# Patient Record
Sex: MALE | Race: WHITE
[De-identification: names, ages, dates, MRNs, and addresses within clinical notes are randomized per-mention and may not be internally consistent; named-entity substitution may affect disease eponyms.]

---

## 2020-05-18 ENCOUNTER — HOSPITAL ENCOUNTER (OUTPATIENT)
Dept: HOSPITAL 56 - MW.ED | Age: 75
Setting detail: OBSERVATION
LOS: 1 days | Discharge: HOME | End: 2020-05-19
Attending: INTERNAL MEDICINE | Admitting: INTERNAL MEDICINE
Payer: MEDICARE

## 2020-05-18 DIAGNOSIS — H53.9: Primary | ICD-10-CM

## 2020-05-18 DIAGNOSIS — I10: ICD-10-CM

## 2020-05-18 DIAGNOSIS — Z79.82: ICD-10-CM

## 2020-05-18 DIAGNOSIS — Z87.74: ICD-10-CM

## 2020-05-18 DIAGNOSIS — Z79.899: ICD-10-CM

## 2020-05-18 DIAGNOSIS — I69.320: ICD-10-CM

## 2020-05-18 LAB
BUN SERPL-MCNC: 19 MG/DL (ref 7–18)
CHLORIDE SERPL-SCNC: 104 MMOL/L (ref 98–107)
CO2 SERPL-SCNC: 31.4 MMOL/L (ref 21–32)
GLUCOSE SERPL-MCNC: 97 MG/DL (ref 74–106)
POTASSIUM SERPL-SCNC: 4.2 MMOL/L (ref 3.5–5.1)
SODIUM SERPL-SCNC: 140 MMOL/L (ref 136–148)

## 2020-05-18 PROCEDURE — 85610 PROTHROMBIN TIME: CPT

## 2020-05-18 PROCEDURE — 80053 COMPREHEN METABOLIC PANEL: CPT

## 2020-05-18 PROCEDURE — 84484 ASSAY OF TROPONIN QUANT: CPT

## 2020-05-18 PROCEDURE — 80061 LIPID PANEL: CPT

## 2020-05-18 PROCEDURE — 70498 CT ANGIOGRAPHY NECK: CPT

## 2020-05-18 PROCEDURE — 85025 COMPLETE CBC W/AUTO DIFF WBC: CPT

## 2020-05-18 PROCEDURE — 70450 CT HEAD/BRAIN W/O DYE: CPT

## 2020-05-18 PROCEDURE — 81001 URINALYSIS AUTO W/SCOPE: CPT

## 2020-05-18 PROCEDURE — 84443 ASSAY THYROID STIM HORMONE: CPT

## 2020-05-18 PROCEDURE — 83036 HEMOGLOBIN GLYCOSYLATED A1C: CPT

## 2020-05-18 PROCEDURE — 93306 TTE W/DOPPLER COMPLETE: CPT

## 2020-05-18 PROCEDURE — 36415 COLL VENOUS BLD VENIPUNCTURE: CPT

## 2020-05-18 PROCEDURE — 93005 ELECTROCARDIOGRAM TRACING: CPT

## 2020-05-18 PROCEDURE — 99285 EMERGENCY DEPT VISIT HI MDM: CPT

## 2020-05-18 PROCEDURE — 85730 THROMBOPLASTIN TIME PARTIAL: CPT

## 2020-05-18 PROCEDURE — 70496 CT ANGIOGRAPHY HEAD: CPT

## 2020-05-18 NOTE — PCM.HP.2
H&P History of Present Illness





- General


Date of Service: 05/18/20


Admit Problem/Dx: 


 Admission Diagnosis/Problem





Admission Diagnosis/Problem      TIA, Transient ischemic attack











- History of Present Illness


Initial Comments - Free Text/Narative: 


This patient is a 75-year-old male with a past medical history of a remote CVA 

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 

currently not on any blood thinner, prostate Ca currently. Patient states that 

approximately 70 minutes prior to arrival to ER, he experienced total blindness 

to the bilateral upper visual fields of the right eye. His visual disturbance 

was not associated with any facial numbness or weakness, dysarthria, facial 

droop, extremity numbness or weakness, gait instability, nausea, or vomiting.   

This lasted for about 1 minute before symptoms subsided.  He took 2 full dose 

aspirin tablets  before driving to the emergency department. In the ER patients 

symptoms had resolved,  He denies any known history of hypertension, 

hyperlipidemia, or diabetes mellitus.  Patient was mildly hypertensive 

otherwise hemodynamically stable,  NIH score of 0. twelve-lead EKG, showing 

sinus bradycardia with a left bundle branch block and a first-degree AV block 

but no acute ischemia or ectopy.





Lab work was normal, Troponin testing is negative. Head CT along with 

angiography neck, showing a old left parietal infarct but no acute changes. 

Patient is being admitted for further care for possible TIA 





.





- Related Data


Allergies/Adverse Reactions: 


 Allergies











Allergy/AdvReac Type Severity Reaction Status Date / Time


 


No Known Allergies Allergy   Verified 05/18/20 19:32











Home Medications: 


 Home Meds





. [No Known Home Meds]  05/18/20 [History]











Past Medical History


HEENT History: Reports: None


Cardiovascular History: Reports: CAD, Hypertension


Respiratory History: Reports: None


Gastrointestinal History: Reports: None


Genitourinary History: Reports: None


Musculoskeletal History: Reports: None


Neurological History: Reports: CVA (Remote history of an ischemic CVA 

approximately 20 years ago), Other (See Below)


Other Neuro History: Stroke


Psychiatric History: Reports: None


Endocrine/Metabolic History: Reports: None


Hematologic History: Reports: None


Immunologic History: Reports: None


Oncologic (Cancer) History: Reports: None


Dermatologic History: Reports: None





- Infectious Disease History


Infectious Disease History: Reports: Chicken Pox





- Past Surgical History


HEENT Surgical History: Reports: Tonsillectomy


Male  Surgical History: Reports: None





Social & Family History





- Family History


Family Medical History: Noncontributory





- Tobacco Use


Smoking Status *Q: Never Smoker





- Caffeine Use


Caffeine Use: Reports: Coffee





- Recreational Drug Use


Recreational Drug Use: No





H&P Review of Systems





- Review of Systems:


Review Of Systems: See Below


General: Denies: Fever, Chills, Malaise


HEENT: Reports: Visual Changes (resolved).  Denies: Contact Lenses, Dysphasia


Pulmonary: Denies: Shortness of Breath, Wheezing, Pleuritic Chest Pain


Cardiovascular: Denies: Chest Pain, Palpitations, Dyspnea on Exertion


Gastrointestinal: Denies: Abdominal Pain, Anorexia, Black Stool, Hematemesis, 

Nausea


Genitourinary: Denies: Dysuria


Musculoskeletal: Denies: Neck Pain, Shoulder Pain


Skin: Denies: Cyanosis, Jaundice, Mottled


Psychiatric: Denies: Confusion, Depression, Mood Lability, Anxiety


Neurological: Denies: Confusion, Dizziness, Headache, Syncope, Tingling


Hematologic/Lymphatic: Denies: Anemia, Easy Bleeding, Easy Bruising





Exam





- Exam


Exam: See Below





- Vital Signs


Vital Signs: 


 Last Vital Signs











Temp  36.0 C L  05/18/20 19:29


 


Pulse  59 L  05/18/20 20:45


 


Resp  11 L  05/18/20 20:45


 


BP  150/84 H  05/18/20 20:45


 


Pulse Ox  99   05/18/20 20:45











Weight: 97.522 kg





- Exam


General: Alert, Oriented


HEENT: Conjunctiva Clear


Neck: Supple, Trachea Midline


Lungs: Clear to Auscultation, Normal Respiratory Effort


Cardiovascular: Regular Rhythm, Normal S1, Normal S2, Bradycardia


GI/Abdominal Exam: Normal Bowel Sounds, Soft, Non-Tender


Extremities: Normal Inspection, Normal Range of Motion


Peripheral Pulses: 3+: Dorsalis Pedis (L), Dorsalis Pedis (R)


Skin: Warm


Neuro Extensive - Mental Status: Alert, Oriented x3, Normal Mood/Affect


Neuro Extensive - Motor, Sensory, Reflexes: CN II-XII Intact, Normal Gait, 

Normal Reflexes.  No: Ataxia, Tongue Deviation (L), Dysarthria, Receptive 

Aphasia, Expressive Aphasia


DTR: 2+: Patella (L), Patella (R)





- Patient Data


Lab Results Last 24 hrs: 


 Laboratory Results - last 24 hr











  05/18/20 05/18/20 05/18/20 Range/Units





  19:05 19:05 19:05 


 


WBC  5.18    (4.0-11.0)  K/uL


 


RBC  4.44 L    (4.50-5.90)  M/uL


 


Hgb  13.6    (13.0-17.0)  g/dL


 


Hct  41.7    (38.0-50.0)  %


 


MCV  93.9    (80.0-98.0)  fL


 


MCH  30.6    (27.0-32.0)  pg


 


MCHC  32.6    (31.0-37.0)  g/dL


 


RDW Std Deviation  48.8    (28.0-62.0)  fl


 


RDW Coeff of Pamela  14    (11.0-15.0)  %


 


Plt Count  204    (150-400)  K/uL


 


MPV  9.70    (7.40-12.00)  fL


 


Neut % (Auto)  54.6    (48.0-80.0)  %


 


Lymph % (Auto)  32.2    (16.0-40.0)  %


 


Mono % (Auto)  8.5    (0.0-15.0)  %


 


Eos % (Auto)  3.5    (0.0-7.0)  %


 


Baso % (Auto)  1.2    (0.0-1.5)  %


 


Neut # (Auto)  2.8    (1.4-5.7)  K/uL


 


Lymph # (Auto)  1.7    (0.6-2.4)  K/uL


 


Mono # (Auto)  0.4    (0.0-0.8)  K/uL


 


Eos # (Auto)  0.2    (0.0-0.7)  K/uL


 


Baso # (Auto)  0.1    (0.0-0.1)  K/uL


 


Nucleated RBC %  0.0    /100WBC


 


Nucleated RBCs #  0    K/uL


 


INR   1.01   


 


APTT   27.8   (18.6-31.3)  SEC


 


Sodium    140  (136-148)  mmol/L


 


Potassium    4.2  (3.5-5.1)  mmol/L


 


Chloride    104  ()  mmol/L


 


Carbon Dioxide    31.4  (21.0-32.0)  mmol/L


 


BUN    19 H  (7.0-18.0)  mg/dL


 


Creatinine    1.1  (0.8-1.3)  mg/dL


 


Est Cr Clr Drug Dosing    75.38  mL/min


 


Estimated GFR (MDRD)    > 60.0  ml/min


 


Glucose    97  ()  mg/dL


 


Calcium    8.6  (8.5-10.1)  mg/dL


 


Total Bilirubin    0.4  (0.2-1.0)  mg/dL


 


AST    18  (15-37)  IU/L


 


ALT    24  (14-63)  IU/L


 


Alkaline Phosphatase    66  ()  U/L


 


Troponin I    < 0.050  (0.000-0.056)  ng/mL


 


Total Protein    6.6  (6.4-8.2)  g/dL


 


Albumin    3.6  (3.4-5.0)  g/dL


 


Globulin    3.0  (2.6-4.0)  g/dL


 


Albumin/Globulin Ratio    1.2  (0.9-1.6)  











Result Diagrams: 


 05/18/20 19:05





 05/18/20 19:05





Sepsis Event Note





- Evaluation


Sepsis Screening Result: No Definite Risk





- Focused Exam


Vital Signs: 


 Vital Signs











  Temp Pulse Resp BP Pulse Ox


 


 05/18/20 20:45   59 L  11 L  150/84 H  99


 


 05/18/20 20:00   57 L  11 L  165/90 H  100


 


 05/18/20 19:45   57 L  12  156/79 H  98


 


 05/18/20 19:30   56 L  16  156/79 H  98


 


 05/18/20 19:29  36.0 C L  62  20  162/86 H  95











Date Exam was Performed: 05/18/20


Time Exam was Performed: 22:44





- Problem List


(1) Vision changes


SNOMED Code(s): 087129997


   ICD Code: H53.9 - UNSPECIFIED VISUAL DISTURBANCE   Status: Acute   Current 

Visit: No   





(2) CVA, old, aphasia


SNOMED Code(s): 543151082


   ICD Code: I69.320 - APHASIA FOLLOWING CEREBRAL INFARCTION   Status: Acute   

Current Visit: No   





(3) HTN (hypertension)


SNOMED Code(s): 59464745


   ICD Code: I10 - ESSENTIAL (PRIMARY) HYPERTENSION   Status: Acute   Current 

Visit: No   


Problem List Initiated/Reviewed/Updated: Yes


Orders Last 24hrs: 


 Active Orders 24 hr











 Category Date Time Status


 


 Admission Status [Patient Status] [ADT] Stat ADT  05/18/20 21:18 Active


 


 Ambulate [RC] ASDIRECTED Care  05/18/20 21:29 Ordered


 


 Antiembolic Devices [RC] PER UNIT ROUTINE Care  05/18/20 21:30 Ordered


 


 Assess Neurological Status [RC] CONTINUOUS Care  05/18/20 19:10 Active


 


 Blood Glucose Check, Bedside [RC] STAT Care  05/18/20 19:10 Active


 


 Cardiac Monitoring [RC] CONTINUOUS Care  05/18/20 19:10 Active


 


 Communication Order [RC] STAT Care  05/18/20 19:10 Active


 


 EKG 12 Lead [EKG Documentation Completion] [RC] STAT Care  05/18/20 19:14 

Active


 


 Height and Weight [RC] UPON Care  05/18/20 19:10 Active


 


 NIH Stroke Scale [RC] STAT Care  05/18/20 19:10 Active


 


 Oxygen Therapy [RC] PRN Care  05/18/20 21:29 Ordered


 


 Oxygen Therapy, ED [RC] ASDIRECTED Care  05/18/20 19:10 Active


 


 VTE/DVT Education [RC] PER UNIT ROUTINE Care  05/18/20 21:29 Ordered


 


 Vital Signs [RC] Q15M Care  05/18/20 19:10 Active


 


 Vital Signs [RC] Q4H Care  05/18/20 21:29 Ordered


 


 Heart Healthy Diet [DIET] Diet  05/18/20 Dinner Ordered


 


 Ang Head wo Cont [MR] Routine Exams  05/18/20 21:32 Ordered


 


 Brain wo Cont [MR] Routine Exams  05/18/20 21:32 Ordered


 


 GLYCOSYLATED HEMOGLOBIN,HGBA1C [CHEM] AM Lab  05/19/20 05:11 Ordered


 


 LIPID PANEL [CHEM] AM Lab  05/19/20 05:11 Ordered


 


 TROPONIN I [CHEM] Routine Lab  05/18/20 22:00 Ordered


 


 TSH [CHEM] AM Lab  05/19/20 05:11 Ordered


 


 UA W/MICROSCOPIC [URIN] Routine Lab  05/18/20 21:29 Ordered


 


 Acetaminophen [Tylenol] Med  05/18/20 21:29 Ordered





 650 mg PO Q4H PRN   


 


 Aspirin Med  05/19/20 09:00 Ordered





 81 mg PO DAILY   


 


 Sodium Chloride 0.9% [Normal Saline] Med  05/18/20 19:10 Active





 10 ml IV ASDIRECTED PRN   


 


 Sodium Chloride 0.9% [Saline Flush] Med  05/18/20 19:10 Active





 10 ml FLUSH ASDIRECTED PRN   


 


 Sodium Chloride 0.9% [Saline Flush] Med  05/18/20 19:10 Active





 2.5 ml FLUSH ASDIRECTED PRN   


 


 atorvaSTATin [Lipitor] Med  05/19/20 21:00 Ordered





 40 mg PO BEDTIME   


 


 Peripheral IV Insertion Adult [OM.PC] Stat Oth  05/18/20 19:10 Ordered


 


 Sequential Compression Device [OM.PC] Per Unit Routine Oth  05/18/20 21:29 

Ordered


 


 Resuscitation Status Stat Resus Stat  05/18/20 19:10 Ordered








 Medication Orders





Acetaminophen (Tylenol)  650 mg PO Q4H PRN


   PRN Reason: Pain (Mild 1-3)/fever


Aspirin (Aspirin)  81 mg PO DAILY ASIA


Atorvastatin Calcium (Lipitor)  40 mg PO BEDTIME ASIA


Sodium Chloride (Saline Flush)  10 ml FLUSH ASDIRECTED PRN


   PRN Reason: Keep Vein Open


Sodium Chloride (Saline Flush)  2.5 ml FLUSH ASDIRECTED PRN


   PRN Reason: Keep Vein Open


Sodium Chloride (Normal Saline)  10 ml IV ASDIRECTED PRN


   PRN Reason: IV Use








Assessment/Plan Comment:: 





76 y/o M admitted for possible TIA


CTA scan negative for acute findings


Obtain MRI/MRA of brain


Obtain 2D ECHO


Lipid panel, Glycated Hb, TSH for risk stratification


ASA, Statin


Allow permissive HTN 


SCD for DVT ppx

## 2020-05-18 NOTE — EDM.PDOC
ED HPI GENERAL MEDICAL PROBLEM





- General


Chief Complaint: Neuro Symptoms/Deficits


Stated Complaint: STROKE CODE


Time Seen by Provider: 05/18/20 19:10


Source of Information: Reports: Patient


History Limitations: Reports: No Limitations





- History of Present Illness


INITIAL COMMENTS - FREE TEXT/NARRATIVE: 





This patient is a 75-year-old male with a past medical history of a ischemic 

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 

strokelike symptoms.  Approximately 70 minutes prior to arrival, the patient 

noted that he had total blindness to the bilateral upper visual fields of the 

right eye.  This lasted for about 1 minute before symptoms subsided.  He took 2 

full dose aspirin tablets (approximately 648 mg) before driving to the 

emergency department.





Here in the emergency department, he denies any neurologic complaints at this 

point.  His visual disturbance was not associated with any facial numbness or 

weakness, dysarthria, facial droop, extremity numbness or weakness, gait 

instability, nausea, or vomiting.  He denies any known history of hypertension, 

hyperlipidemia, or diabetes mellitus.  At present, he has no complaints.


Onset: Today


Duration: Hour(s): (1)





- Related Data


 Allergies











Allergy/AdvReac Type Severity Reaction Status Date / Time


 


No Known Allergies Allergy   Verified 05/18/20 19:32











Home Meds: 


 Home Meds





. [No Known Home Meds]  05/18/20 [History]











Past Medical History


HEENT History: Reports: None


Cardiovascular History: Reports: CAD, Hypertension


Respiratory History: Reports: None


Gastrointestinal History: Reports: None


Genitourinary History: Reports: None


Musculoskeletal History: Reports: None


Neurological History: Reports: CVA (Remote history of an ischemic CVA 

approximately 20 years ago), Other (See Below)


Other Neuro History: Stroke


Psychiatric History: Reports: None


Endocrine/Metabolic History: Reports: None


Hematologic History: Reports: None


Immunologic History: Reports: None


Oncologic (Cancer) History: Reports: None


Dermatologic History: Reports: None





- Infectious Disease History


Infectious Disease History: Reports: Chicken Pox





- Past Surgical History


HEENT Surgical History: Reports: Tonsillectomy


Male  Surgical History: Reports: None





Social & Family History





- Family History


Family Medical History: Noncontributory





- Caffeine Use


Caffeine Use: Reports: Coffee





ED ROS GENERAL





- Review of Systems


Review Of Systems: Comprehensive ROS is negative, except as noted in HPI.


Constitutional: Denies: Fever


HEENT: Reports: Vision Change


Respiratory: Denies: Shortness of Breath


Cardiovascular: Denies: Chest Pain


Endocrine: Reports: No Symptoms


GI/Abdominal: Denies: Abdominal Pain, Difficulty Swallowing, Nausea, Vomiting


: Reports: No Symptoms


Musculoskeletal: Reports: No Symptoms


Skin: Reports: No Symptoms


Neurological: Reports: Other (Transient visual disturbance).  Denies: Dizziness

, Headache, Numbness, Trouble Speaking, Difficulty Walking, Weakness, Change in 

Speech, Gait Disturbance


Psychiatric: Reports: No Symptoms


Hematologic/Lymphatic: Reports: No Symptoms


Immunologic: Reports: No Symptoms





ED EXAM, NEURO





- Physical Exam


Exam: See Below


Exam Limited By: No Limitations


General Appearance: Alert


Ears: Hearing Grossly Normal


Nose: Normal Inspection


Throat/Mouth: Normal Inspection


Head Exam: Atraumatic, Normocephalic


Neck: Supple, Non-Tender, Full Range of Motion.  No: Carotid Bruit, Limited 

Range of Motion


Respiratory/Chest: Lungs Clear, Normal Breath Sounds, No Accessory Muscle Use


Cardiovascular: Normal Peripheral Pulses, Regular Rate, Rhythm, No Edema, No 

Gallop


GI/Abdominal: Soft, Non-Tender


Neurological: Alert, Normal Mood/Affect, CN II-XII Intact, No Motor/Sensory 

Deficits, Oriented x 3, Other.  No: Abnormal Gait, Abnormal Finger to Nose


Extremities: Normal Range of Motion


Psychiatric: Normal Affect, Normal Mood


Skin Exam: Warm, Dry





EKG INTERPRETATION


EKG Date: 05/18/20


Time: 19:31


Rhythm: Other (Sinus bradycardia)


Rate (Beats/Min): 55


Axis: LAD-Left Axis Deviation


P-Wave: Enlarged


QRS: LBBB


ST-T: Normal


QT: Normal


EKG Interpretation Comments: 





Sinus bradycardia 55 bpm.  Left axis deviation.  First-degree AV block.  Left 

bundle branch block.  No ectopy or acute ischemia appreciated.





Course





- Vital Signs


Text/Narrative:: 





On arrival, the patient was mildly hypertensive but afebrile and 

hemodynamically stable, well-appearing.  Patient was immediately roomed in 

triage.  No focal neurologic deficits noted, NIH score of 0.  IV access was 

established and labs were sent off.  We obtained a twelve-lead EKG, showing 

sinus bradycardia with a left bundle branch block and a first-degree AV block 

but no acute ischemia or ectopy.





Labs returned showing no significant derangements.  Normal cell lines.  Normal 

INR and APTT.  Metabolic panel shows mildly elevated BUN but normal creatinine 

and electrolytes.  Troponin testing is negative.  Hepatic markers are negative.

  We obtained a noncontrast head CT along with angiography neck, showing a old 

left parietal infarct but no acute changes.





Patient received full dose aspirin prior to arrival to the hospital so this was 

not given here.  Symptoms are concerning for a transient ischemic attack.  

ABCD2 score is 2.  Serial examinations revealed no neurologic deficits, NIH 

score remains 0 during the emergency department course of treatment.  Will plan 

for admission to the hospital on observation status for TIA work-up.  I 

discussed with the accepting hospitalist Dr. Guillermo who agrees to admit to 

observation.


Last Recorded V/S: 


 Last Vital Signs











Temp  36.0 C L  05/18/20 19:29


 


Pulse  59 L  05/18/20 20:45


 


Resp  11 L  05/18/20 20:45


 


BP  150/84 H  05/18/20 20:45


 


Pulse Ox  99   05/18/20 20:45














- Orders/Labs/Meds


Orders: 


 Active Orders 24 hr











 Category Date Time Status


 


 Admission Status [Patient Status] [ADT] Stat ADT  05/18/20 21:18 Active


 


 Ambulate [RC] ASDIRECTED Care  05/18/20 21:29 Active


 


 Antiembolic Devices [RC] PER UNIT ROUTINE Care  05/18/20 21:30 Active


 


 Assess Neurological Status [RC] CONTINUOUS Care  05/18/20 19:10 Active


 


 Blood Glucose Check, Bedside [RC] STAT Care  05/18/20 19:10 Active


 


 Cardiac Monitoring [RC] CONTINUOUS Care  05/18/20 19:10 Active


 


 Communication Order [RC] STAT Care  05/18/20 19:10 Active


 


 EKG 12 Lead [EKG Documentation Completion] [RC] STAT Care  05/18/20 19:14 

Active


 


 Height and Weight [RC] UPON Care  05/18/20 19:10 Active


 


 NIH Stroke Scale [RC] STAT Care  05/18/20 19:10 Active


 


 Oxygen Therapy [RC] PRN Care  05/18/20 21:29 Active


 


 Oxygen Therapy, ED [RC] ASDIRECTED Care  05/18/20 19:10 Active


 


 VTE/DVT Education [RC] PER UNIT ROUTINE Care  05/18/20 21:29 Active


 


 Vital Signs [RC] Q15M Care  05/18/20 19:10 Active


 


 Vital Signs [RC] Q4H Care  05/18/20 21:29 Active


 


 Heart Healthy Diet [DIET] Diet  05/18/20 Dinner Active


 


 Ang Head wo Cont [MR] Routine Exams  05/18/20 21:32 Ordered


 


 Brain wo Cont [MR] Routine Exams  05/18/20 21:32 Ordered


 


 Echo Comp wo Cont [US] Stat Exams  05/18/20 21:53 Ordered


 


 GLYCOSYLATED HEMOGLOBIN,HGBA1C [CHEM] AM Lab  05/19/20 05:11 Ordered


 


 LIPID PANEL [CHEM] AM Lab  05/19/20 05:11 Ordered


 


 TROPONIN I [CHEM] Routine Lab  05/18/20 22:00 Ordered


 


 TSH [CHEM] AM Lab  05/19/20 05:11 Ordered


 


 UA W/MICROSCOPIC [URIN] Routine Lab  05/18/20 21:29 Ordered


 


 Acetaminophen [Tylenol] Med  05/18/20 21:29 Active





 650 mg PO Q4H PRN   


 


 Aspirin Med  05/19/20 09:00 Active





 81 mg PO DAILY   


 


 Sodium Chloride 0.9% [Normal Saline] Med  05/18/20 19:10 Active





 10 ml IV ASDIRECTED PRN   


 


 Sodium Chloride 0.9% [Saline Flush] Med  05/18/20 19:10 Active





 10 ml FLUSH ASDIRECTED PRN   


 


 Sodium Chloride 0.9% [Saline Flush] Med  05/18/20 19:10 Active





 2.5 ml FLUSH ASDIRECTED PRN   


 


 atorvaSTATin [Lipitor] Med  05/19/20 21:00 Active





 40 mg PO BEDTIME   


 


 Peripheral IV Insertion Adult [OM.PC] Stat Oth  05/18/20 19:10 Ordered


 


 Sequential Compression Device [OM.PC] Per Unit Routine Oth  05/18/20 21:29 

Ordered


 


 Resuscitation Status Stat Resus Stat  05/18/20 19:10 Ordered








 Medication Orders





Acetaminophen (Tylenol)  650 mg PO Q4H PRN


   PRN Reason: Pain (Mild 1-3)/fever


Aspirin (Aspirin)  81 mg PO DAILY ASIA


Atorvastatin Calcium (Lipitor)  40 mg PO BEDTIME ASIA


Sodium Chloride (Saline Flush)  10 ml FLUSH ASDIRECTED PRN


   PRN Reason: Keep Vein Open


Sodium Chloride (Saline Flush)  2.5 ml FLUSH ASDIRECTED PRN


   PRN Reason: Keep Vein Open


Sodium Chloride (Normal Saline)  10 ml IV ASDIRECTED PRN


   PRN Reason: IV Use








Labs: 


 Laboratory Tests











  05/18/20 05/18/20 05/18/20 Range/Units





  19:05 19:05 19:05 


 


WBC  5.18    (4.0-11.0)  K/uL


 


RBC  4.44 L    (4.50-5.90)  M/uL


 


Hgb  13.6    (13.0-17.0)  g/dL


 


Hct  41.7    (38.0-50.0)  %


 


MCV  93.9    (80.0-98.0)  fL


 


MCH  30.6    (27.0-32.0)  pg


 


MCHC  32.6    (31.0-37.0)  g/dL


 


RDW Std Deviation  48.8    (28.0-62.0)  fl


 


RDW Coeff of Pamela  14    (11.0-15.0)  %


 


Plt Count  204    (150-400)  K/uL


 


MPV  9.70    (7.40-12.00)  fL


 


Neut % (Auto)  54.6    (48.0-80.0)  %


 


Lymph % (Auto)  32.2    (16.0-40.0)  %


 


Mono % (Auto)  8.5    (0.0-15.0)  %


 


Eos % (Auto)  3.5    (0.0-7.0)  %


 


Baso % (Auto)  1.2    (0.0-1.5)  %


 


Neut # (Auto)  2.8    (1.4-5.7)  K/uL


 


Lymph # (Auto)  1.7    (0.6-2.4)  K/uL


 


Mono # (Auto)  0.4    (0.0-0.8)  K/uL


 


Eos # (Auto)  0.2    (0.0-0.7)  K/uL


 


Baso # (Auto)  0.1    (0.0-0.1)  K/uL


 


Nucleated RBC %  0.0    /100WBC


 


Nucleated RBCs #  0    K/uL


 


INR   1.01   


 


APTT   27.8   (18.6-31.3)  SEC


 


Sodium    140  (136-148)  mmol/L


 


Potassium    4.2  (3.5-5.1)  mmol/L


 


Chloride    104  ()  mmol/L


 


Carbon Dioxide    31.4  (21.0-32.0)  mmol/L


 


BUN    19 H  (7.0-18.0)  mg/dL


 


Creatinine    1.1  (0.8-1.3)  mg/dL


 


Est Cr Clr Drug Dosing    75.38  mL/min


 


Estimated GFR (MDRD)    > 60.0  ml/min


 


Glucose    97  ()  mg/dL


 


Calcium    8.6  (8.5-10.1)  mg/dL


 


Total Bilirubin    0.4  (0.2-1.0)  mg/dL


 


AST    18  (15-37)  IU/L


 


ALT    24  (14-63)  IU/L


 


Alkaline Phosphatase    66  ()  U/L


 


Troponin I    < 0.050  (0.000-0.056)  ng/mL


 


Total Protein    6.6  (6.4-8.2)  g/dL


 


Albumin    3.6  (3.4-5.0)  g/dL


 


Globulin    3.0  (2.6-4.0)  g/dL


 


Albumin/Globulin Ratio    1.2  (0.9-1.6)  











Meds: 


Medications











Generic Name Dose Route Start Last Admin





  Trade Name Freq  PRN Reason Stop Dose Admin


 


Acetaminophen  650 mg  05/18/20 21:29  





  Tylenol  PO   





  Q4H PRN   





  Pain (Mild 1-3)/fever   





     





     





     


 


Aspirin  81 mg  05/19/20 09:00  





  Aspirin  PO   





  DAILY ASIA   





     





     





     





     


 


Atorvastatin Calcium  40 mg  05/19/20 21:00  





  Lipitor  PO   





  BEDTIME ASIA   





     





     





     





     


 


Sodium Chloride  10 ml  05/18/20 19:10  





  Saline Flush  FLUSH   





  ASDIRECTED PRN   





  Keep Vein Open   





     





     





     


 


Sodium Chloride  2.5 ml  05/18/20 19:10  





  Saline Flush  FLUSH   





  ASDIRECTED PRN   





  Keep Vein Open   





     





     





     


 


Sodium Chloride  10 ml  05/18/20 19:10  





  Normal Saline  IV   





  ASDIRECTED PRN   





  IV Use   





     





     





     














Discontinued Medications














Generic Name Dose Route Start Last Admin





  Trade Name Freq  PRN Reason Stop Dose Admin


 


Aspirin  324 mg  05/18/20 19:10  05/18/20 19:29





  Aspirin  PO  05/18/20 19:11  Not Given





  ONETIME ONE   





     





     





     





     


 


Iopamidol  100 ml  05/18/20 20:47  05/18/20 20:47





  Isovue-370 (76%)  IVPUSH  05/18/20 20:48  100 ml





  ONETIME STA   Administration





     





     





     





     














- Re-Assessments/Exams


Free Text/Narrative Re-Assessment/Exam: 





05/18/20 21:34


Resting comfortably, no complaints.  Ambulated without difficulty.  No 

neurologic deficits noted.  Negative Romberg.  Remains complaint free.





Departure





- Departure


Time of Disposition: 21:00


Disposition: Refer to Observation


Condition: Good


Clinical Impression: 


 TIA (transient ischemic attack)








- Discharge Information


*PRESCRIPTION DRUG MONITORING PROGRAM REVIEWED*: Not Applicable


*COPY OF PRESCRIPTION DRUG MONITORING REPORT IN PATIENT JOSEPH: Not Applicable


Forms:  ED Department Discharge





Sepsis Event Note





- Focused Exam


Vital Signs: 


 Vital Signs











  Temp Pulse Resp BP Pulse Ox


 


 05/18/20 20:45   59 L  11 L  150/84 H  99


 


 05/18/20 20:00   57 L  11 L  165/90 H  100


 


 05/18/20 19:45   57 L  12  156/79 H  98


 


 05/18/20 19:30   56 L  16  156/79 H  98


 


 05/18/20 19:29  36.0 C L  62  20  162/86 H  95











Date Exam was Performed: 05/18/20


Time Exam was Performed: 21:54





- My Orders


Last 24 Hours: 


My Active Orders





05/18/20 19:10


Assess Neurological Status [RC] CONTINUOUS 


Blood Glucose Check, Bedside [RC] STAT 


Cardiac Monitoring [RC] CONTINUOUS 


Communication Order [RC] STAT 


Height and Weight [RC] UPON 


NIH Stroke Scale [RC] STAT 


Oxygen Therapy, ED [RC] ASDIRECTED 


Vital Signs [RC] Q15M 


Sodium Chloride 0.9% [Normal Saline]   10 ml IV ASDIRECTED PRN 


Sodium Chloride 0.9% [Saline Flush]   10 ml FLUSH ASDIRECTED PRN 


Sodium Chloride 0.9% [Saline Flush]   2.5 ml FLUSH ASDIRECTED PRN 


Peripheral IV Insertion Adult [OM.PC] Stat 


Resuscitation Status Stat 





05/18/20 19:14


EKG 12 Lead [EKG Documentation Completion] [RC] STAT 





05/18/20 21:18


Admission Status [Patient Status] [ADT] Stat 














- Assessment/Plan


Last 24 Hours: 


My Active Orders





05/18/20 19:10


Assess Neurological Status [RC] CONTINUOUS 


Blood Glucose Check, Bedside [RC] STAT 


Cardiac Monitoring [RC] CONTINUOUS 


Communication Order [RC] STAT 


Height and Weight [RC] UPON 


NIH Stroke Scale [RC] STAT 


Oxygen Therapy, ED [RC] ASDIRECTED 


Vital Signs [RC] Q15M 


Sodium Chloride 0.9% [Normal Saline]   10 ml IV ASDIRECTED PRN 


Sodium Chloride 0.9% [Saline Flush]   10 ml FLUSH ASDIRECTED PRN 


Sodium Chloride 0.9% [Saline Flush]   2.5 ml FLUSH ASDIRECTED PRN 


Peripheral IV Insertion Adult [OM.PC] Stat 


Resuscitation Status Stat 





05/18/20 19:14


EKG 12 Lead [EKG Documentation Completion] [RC] STAT 





05/18/20 21:18


Admission Status [Patient Status] [ADT] Stat

## 2020-05-18 NOTE — CT
Head CT

 

Technique: Multiple axial sections through the brain were obtained.  

Intravenous contrast was not utilized.

 

Comparison: Prior head CT study of 09/23/17.

 

Findings: Old infarct is noted within the left parietal region.  

Findings are similar to previous exam.

 

Ventricles along with basal cisterns and sulci over the convexities 

are within normal limits for the patient's age.  No other abnormal 

parenchymal densities are seen.  No evidence of intracranial 

hemorrhage.  No midline shift or mass-effect is seen.

 

No acute calvarial finding is seen.  Minimal mucosal thickening is 

seen within the inferior right mastoid sinuses which is likely 

incidental.  Mucosal thickening is seen within the paranasal sinuses 

which is most likely chronic.

 

Impression:

1.  Probable chronic sinusitis.

2.  Old infarct within the left parietal region.

3.  No acute intracranial abnormality is appreciated.

 

Diagnostic code #2

 

This report was dictated in MDT

## 2020-05-18 NOTE — CT
CT angiogram of neck (without and with intravenous contrast)

 

Noncontrast images shows mild atherosclerotic calcification within the

 carotid bulb.  Mucosal thickening seen within the maxillary sinuses, 

ethmoid sinuses.  These paranasal sinus findings are most likely 

chronic.  Neck shows no adenopathy.  Technique: Multiple axial 

sections were obtained through the neck.  Intravenous contrast was 

utilized.  Study performed as a CT angiogram protocol.  Multiple MIP 

images were obtained.

 

Findings: Vertebral arteries are opacified no evidence of vertebral 

stenosis.  No dissection is seen.  Patency into the basilar artery is 

noted.

 

Common carotid arteries are patent.  No focal stenosis is seen.  Mild 

atherosclerotic calcification is noted within the carotid bulb.  No 

focal stenosis is seen within the carotid bulb.  Internal carotid 

arteries are patent.  Internal carotid are patent into the carotid 

siphon.

 

Impression:

1.  Mild atherosclerotic calcification around the carotid bulb.

2.  No focal stenosis, occlusion or dissection is seen within the 

common carotid arteries, vertebral arteries or internal carotid 

arteries.

3.  Sinus findings which are most likely chronic.

 

Diagnostic code #2

 

This report was dictated in MDT

## 2020-05-18 NOTE — CT
CT angiogram of brain

 

Technique: Multiple axial sections through the brain were obtained.  

Intravenous contrast was performed.  Intravenous contrast obtained 

during the arterial phase.  Multiple MIP images were obtained.

 

Findings: Basilar artery is patent into the posterior cerebral 

arteries.  No focal stenosis is seen.  Common carotid arteries are 

patent into the middle cerebral arteries and anterior cerebral 

arteries.  No focal stenosis is seen.  No vascular occlusion is 

identified.  No discrete aneurysm is appreciated.

 

Impression:

1.  No abnormality is identified on CT angiogram study of the brain.

 

Diagnostic code #1

 

This report was dictated in MDT

## 2020-05-19 VITALS — DIASTOLIC BLOOD PRESSURE: 81 MMHG | SYSTOLIC BLOOD PRESSURE: 158 MMHG | HEART RATE: 52 BPM

## 2020-05-19 LAB — HBA1C BLD-MCNC: 5.4 % (ref 4.5–6.2)

## 2020-05-19 NOTE — [id number]
<Shayy Contreras - Last Filed: 05/19/20 11:37>





**Discharge Summary





- Hospital Course


HPI Initial Comments: 


Admission Date: 5/18/20


Discharge Date:  5/19/20





Admission Diagnosis:


1.   Transient vision loss suspect TIA


2.    History of CVA and repaired PFO





Discharge Diagnosis:


1.   Transient vision loss suspect TIA


2.    History of CVA and repaired PFO





Procedures: None





Consults: None





Hospital Course: This patient is a 75-year-old male with a past medical history 

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 

presented to the ER with 1 min history of vision loss. He states he lost the 

upper visual fields of the right eye.  Denied any additional symptoms.  Work up 

in the ER revealed no significant values, troponin was negative.  Head CT, Head/

neck CTA showed his old left parietal infarct but no acute changes.  Patient 

was admitted to the medical floor.  He was started on aspirin and statin.  He 

was monitored on telemetry.  Echo and MRI of the brain were scheduled but 

patient requested discharge and requested tests to be done as an outpatient.  

He had no further episodes of vision loss or additional symptoms consistent 

with TIA/stroke.  





Disposition: Home





Discharge Condition:  vitals stable, tolerating oral diet, ambulating without 

difficulty, symptom improvement





Discharge Instructions: regular diet as tolerated, activity as tolerated, take 

medications as prescribed.  Symptoms to report to physician include fever/chills

, vision loss, facial droop, slurred speech, extremity weakness,chest pain, 

shortness of breath, abdominal pain, erythema, drainage/discharge, or not 

improving as expected.





Discharge Medications:





Ascorbate Calcium [Vitamin C] 500 mg PO DAILY


Lutein 1 cap PO DAILY 


Omega-3/DHA/Epa/Fish Oil [Fish Oil 1,000 mg Softgel] 1 cap PO 


Aspirin 325 mg PO DAILY


atorvaSTATin [Lipitor] 40 mg PO BEDTIME  tablet 





Follow-up:


1. PCP


2. Cardiology


3. Outpatient imaging- echo and MRI brain





- Discharge Data


Discharge Date: 05/19/20


Discharge Disposition: Home, Self-Care 01


Condition: Stable





- Referral to Home Health


Primary Care Physician: 


PCP None








- Patient Instructions


Diet: Usual Diet as Tolerated


Activity: As Tolerated


Showering/Bathing: May Shower


Notify Provider of: Fever, Increased Pain, Swelling and Redness, Drainage, 

Nausea and/or Vomiting


Other/Special Instructions: Additional symptoms include chest pain, shortness 

of breath, abdominal pain, slurred speech, vision changes, facial droop, or 

extremity weakness. You will need outpatient MRI of the brain and echo of the 

heart.





- Discharge Plan


*PRESCRIPTION DRUG MONITORING PROGRAM REVIEWED*: Not Applicable


*COPY OF PRESCRIPTION DRUG MONITORING REPORT IN PATIENT JOSEPH: Not Applicable


Prescriptions/Med Rec: 


Aspirin 325 mg PO DAILY 30 Days #30 tablet


Home Medications: 


 Home Meds





Ascorbate Calcium [Vitamin C] 500 mg PO DAILY 05/18/20 [History]


Lutein 1 cap PO DAILY 05/18/20 [History]


Omega-3/DHA/Epa/Fish Oil [Fish Oil 1,000 mg Softgel] 1 cap PO 05/18/20 [History]


Aspirin 325 mg PO DAILY 30 Days #30 tablet 05/19/20 [Rx]


atorvaSTATin [Lipitor] 40 mg PO BEDTIME  tablet 05/19/20 [Rx]








Patient Handouts:  Transient Ischemic Attack, Easy-to-Read, Magnetic Resonance 

Imaging, Easy-to-Read, Atorvastatin tablets, Aspirin, ASA oral tablets, 

Echocardiogram





- Discharge Summary/Plan Comment


DC Time >30 min.: No





- Patient Data


Vitals - Most Recent: 


 Last Vital Signs











Temp  98.1 F   05/19/20 08:00


 


Pulse  52 L  05/19/20 08:00


 


Resp  16   05/19/20 08:00


 


BP  158/81 H  05/19/20 08:00


 


Pulse Ox  94 L  05/19/20 08:00











Weight - Most Recent: 97.522 kg


I&O - Last 24 hours: 


 Intake & Output











 05/18/20 05/19/20 05/19/20





 22:59 06:59 14:59


 


Intake Total  550 


 


Output Total  400 


 


Balance  150 











Lab Results - Last 24 hrs: 


 Laboratory Results - last 24 hr











  05/18/20 05/18/20 05/18/20 Range/Units





  19:05 19:05 19:05 


 


WBC  5.18    (4.0-11.0)  K/uL


 


RBC  4.44 L    (4.50-5.90)  M/uL


 


Hgb  13.6    (13.0-17.0)  g/dL


 


Hct  41.7    (38.0-50.0)  %


 


MCV  93.9    (80.0-98.0)  fL


 


MCH  30.6    (27.0-32.0)  pg


 


MCHC  32.6    (31.0-37.0)  g/dL


 


RDW Std Deviation  48.8    (28.0-62.0)  fl


 


RDW Coeff of Pamela  14    (11.0-15.0)  %


 


Plt Count  204    (150-400)  K/uL


 


MPV  9.70    (7.40-12.00)  fL


 


Neut % (Auto)  54.6    (48.0-80.0)  %


 


Lymph % (Auto)  32.2    (16.0-40.0)  %


 


Mono % (Auto)  8.5    (0.0-15.0)  %


 


Eos % (Auto)  3.5    (0.0-7.0)  %


 


Baso % (Auto)  1.2    (0.0-1.5)  %


 


Neut # (Auto)  2.8    (1.4-5.7)  K/uL


 


Lymph # (Auto)  1.7    (0.6-2.4)  K/uL


 


Mono # (Auto)  0.4    (0.0-0.8)  K/uL


 


Eos # (Auto)  0.2    (0.0-0.7)  K/uL


 


Baso # (Auto)  0.1    (0.0-0.1)  K/uL


 


Nucleated RBC %  0.0    /100WBC


 


Nucleated RBCs #  0    K/uL


 


INR   1.01   


 


APTT   27.8   (18.6-31.3)  SEC


 


Sodium    140  (136-148)  mmol/L


 


Potassium    4.2  (3.5-5.1)  mmol/L


 


Chloride    104  ()  mmol/L


 


Carbon Dioxide    31.4  (21.0-32.0)  mmol/L


 


BUN    19 H  (7.0-18.0)  mg/dL


 


Creatinine    1.1  (0.8-1.3)  mg/dL


 


Est Cr Clr Drug Dosing    75.38  mL/min


 


Estimated GFR (MDRD)    > 60.0  ml/min


 


Glucose    97  ()  mg/dL


 


Hemoglobin A1c     (4.5-6.2)  %


 


Calcium    8.6  (8.5-10.1)  mg/dL


 


Total Bilirubin    0.4  (0.2-1.0)  mg/dL


 


AST    18  (15-37)  IU/L


 


ALT    24  (14-63)  IU/L


 


Alkaline Phosphatase    66  ()  U/L


 


Troponin I    < 0.050  (0.000-0.056)  ng/mL


 


Total Protein    6.6  (6.4-8.2)  g/dL


 


Albumin    3.6  (3.4-5.0)  g/dL


 


Globulin    3.0  (2.6-4.0)  g/dL


 


Albumin/Globulin Ratio    1.2  (0.9-1.6)  


 


Triglycerides     (0-200)  mg/dL


 


Cholesterol     ()  mg/dL


 


LDL Cholesterol, Calc     ()  mg/dL


 


VLDL Cholesterol     (5-55)  mg/dL


 


HDL Cholesterol     (40-60)  mg/dL


 


Cholesterol/HDL Ratio     (3.3-6.0)  


 


TSH 3rd Generation     (0.36-3.74)  uIU/mL


 


Urine Color     


 


Urine Appearance     


 


Urine pH     (5.0-8.0)  


 


Ur Specific Gravity     (1.001-1.035)  


 


Urine Protein     (NEGATIVE)  mg/dL


 


Urine Glucose (UA)     (NEGATIVE)  mg/dL


 


Urine Ketones     (NEGATIVE)  mg/dL


 


Urine Occult Blood     (NEGATIVE)  


 


Urine Nitrite     (NEGATIVE)  


 


Urine Bilirubin     (NEGATIVE)  


 


Urine Urobilinogen     (<2.0)  EU/dL


 


Ur Leukocyte Esterase     (NEGATIVE)  


 


Urine RBC     (0-2/HPF)  


 


Urine WBC     (0-5/HPF)  


 


Ur Epithelial Cells     (NONE-FEW)  


 


Urine Bacteria     (NEGATIVE)  


 


Urine Mucus     (NONE-MOD)  














  05/18/20 05/19/20 05/19/20 Range/Units





  22:03 02:30 05:48 


 


WBC     (4.0-11.0)  K/uL


 


RBC     (4.50-5.90)  M/uL


 


Hgb     (13.0-17.0)  g/dL


 


Hct     (38.0-50.0)  %


 


MCV     (80.0-98.0)  fL


 


MCH     (27.0-32.0)  pg


 


MCHC     (31.0-37.0)  g/dL


 


RDW Std Deviation     (28.0-62.0)  fl


 


RDW Coeff of Pamela     (11.0-15.0)  %


 


Plt Count     (150-400)  K/uL


 


MPV     (7.40-12.00)  fL


 


Neut % (Auto)     (48.0-80.0)  %


 


Lymph % (Auto)     (16.0-40.0)  %


 


Mono % (Auto)     (0.0-15.0)  %


 


Eos % (Auto)     (0.0-7.0)  %


 


Baso % (Auto)     (0.0-1.5)  %


 


Neut # (Auto)     (1.4-5.7)  K/uL


 


Lymph # (Auto)     (0.6-2.4)  K/uL


 


Mono # (Auto)     (0.0-0.8)  K/uL


 


Eos # (Auto)     (0.0-0.7)  K/uL


 


Baso # (Auto)     (0.0-0.1)  K/uL


 


Nucleated RBC %     /100WBC


 


Nucleated RBCs #     K/uL


 


INR     


 


APTT     (18.6-31.3)  SEC


 


Sodium     (136-148)  mmol/L


 


Potassium     (3.5-5.1)  mmol/L


 


Chloride     ()  mmol/L


 


Carbon Dioxide     (21.0-32.0)  mmol/L


 


BUN     (7.0-18.0)  mg/dL


 


Creatinine     (0.8-1.3)  mg/dL


 


Est Cr Clr Drug Dosing     mL/min


 


Estimated GFR (MDRD)     ml/min


 


Glucose     ()  mg/dL


 


Hemoglobin A1c     (4.5-6.2)  %


 


Calcium     (8.5-10.1)  mg/dL


 


Total Bilirubin     (0.2-1.0)  mg/dL


 


AST     (15-37)  IU/L


 


ALT     (14-63)  IU/L


 


Alkaline Phosphatase     ()  U/L


 


Troponin I  < 0.050    (0.000-0.056)  ng/mL


 


Total Protein     (6.4-8.2)  g/dL


 


Albumin     (3.4-5.0)  g/dL


 


Globulin     (2.6-4.0)  g/dL


 


Albumin/Globulin Ratio     (0.9-1.6)  


 


Triglycerides    65  (0-200)  mg/dL


 


Cholesterol    160  ()  mg/dL


 


LDL Cholesterol, Calc    101  ()  mg/dL


 


VLDL Cholesterol    13  (5-55)  mg/dL


 


HDL Cholesterol    46  (40-60)  mg/dL


 


Cholesterol/HDL Ratio    3.5  (3.3-6.0)  


 


TSH 3rd Generation    1.70  (0.36-3.74)  uIU/mL


 


Urine Color   YELLOW   


 


Urine Appearance   CLEAR   


 


Urine pH   6.5   (5.0-8.0)  


 


Ur Specific Gravity   1.010   (1.001-1.035)  


 


Urine Protein   NEGATIVE   (NEGATIVE)  mg/dL


 


Urine Glucose (UA)   NEGATIVE   (NEGATIVE)  mg/dL


 


Urine Ketones   NEGATIVE   (NEGATIVE)  mg/dL


 


Urine Occult Blood   NEGATIVE   (NEGATIVE)  


 


Urine Nitrite   NEGATIVE   (NEGATIVE)  


 


Urine Bilirubin   NEGATIVE   (NEGATIVE)  


 


Urine Urobilinogen   0.2   (<2.0)  EU/dL


 


Ur Leukocyte Esterase   NEGATIVE   (NEGATIVE)  


 


Urine RBC   NONE SEEN   (0-2/HPF)  


 


Urine WBC   0-1   (0-5/HPF)  


 


Ur Epithelial Cells   RARE   (NONE-FEW)  


 


Urine Bacteria   RARE   (NEGATIVE)  


 


Urine Mucus   LIGHT   (NONE-MOD)  














  05/19/20 Range/Units





  05:48 


 


WBC   (4.0-11.0)  K/uL


 


RBC   (4.50-5.90)  M/uL


 


Hgb   (13.0-17.0)  g/dL


 


Hct   (38.0-50.0)  %


 


MCV   (80.0-98.0)  fL


 


MCH   (27.0-32.0)  pg


 


MCHC   (31.0-37.0)  g/dL


 


RDW Std Deviation   (28.0-62.0)  fl


 


RDW Coeff of Pamela   (11.0-15.0)  %


 


Plt Count   (150-400)  K/uL


 


MPV   (7.40-12.00)  fL


 


Neut % (Auto)   (48.0-80.0)  %


 


Lymph % (Auto)   (16.0-40.0)  %


 


Mono % (Auto)   (0.0-15.0)  %


 


Eos % (Auto)   (0.0-7.0)  %


 


Baso % (Auto)   (0.0-1.5)  %


 


Neut # (Auto)   (1.4-5.7)  K/uL


 


Lymph # (Auto)   (0.6-2.4)  K/uL


 


Mono # (Auto)   (0.0-0.8)  K/uL


 


Eos # (Auto)   (0.0-0.7)  K/uL


 


Baso # (Auto)   (0.0-0.1)  K/uL


 


Nucleated RBC %   /100WBC


 


Nucleated RBCs #   K/uL


 


INR   


 


APTT   (18.6-31.3)  SEC


 


Sodium   (136-148)  mmol/L


 


Potassium   (3.5-5.1)  mmol/L


 


Chloride   ()  mmol/L


 


Carbon Dioxide   (21.0-32.0)  mmol/L


 


BUN   (7.0-18.0)  mg/dL


 


Creatinine   (0.8-1.3)  mg/dL


 


Est Cr Clr Drug Dosing   mL/min


 


Estimated GFR (MDRD)   ml/min


 


Glucose   ()  mg/dL


 


Hemoglobin A1c  5.4  (4.5-6.2)  %


 


Calcium   (8.5-10.1)  mg/dL


 


Total Bilirubin   (0.2-1.0)  mg/dL


 


AST   (15-37)  IU/L


 


ALT   (14-63)  IU/L


 


Alkaline Phosphatase   ()  U/L


 


Troponin I   (0.000-0.056)  ng/mL


 


Total Protein   (6.4-8.2)  g/dL


 


Albumin   (3.4-5.0)  g/dL


 


Globulin   (2.6-4.0)  g/dL


 


Albumin/Globulin Ratio   (0.9-1.6)  


 


Triglycerides   (0-200)  mg/dL


 


Cholesterol   ()  mg/dL


 


LDL Cholesterol, Calc   ()  mg/dL


 


VLDL Cholesterol   (5-55)  mg/dL


 


HDL Cholesterol   (40-60)  mg/dL


 


Cholesterol/HDL Ratio   (3.3-6.0)  


 


TSH 3rd Generation   (0.36-3.74)  uIU/mL


 


Urine Color   


 


Urine Appearance   


 


Urine pH   (5.0-8.0)  


 


Ur Specific Gravity   (1.001-1.035)  


 


Urine Protein   (NEGATIVE)  mg/dL


 


Urine Glucose (UA)   (NEGATIVE)  mg/dL


 


Urine Ketones   (NEGATIVE)  mg/dL


 


Urine Occult Blood   (NEGATIVE)  


 


Urine Nitrite   (NEGATIVE)  


 


Urine Bilirubin   (NEGATIVE)  


 


Urine Urobilinogen   (<2.0)  EU/dL


 


Ur Leukocyte Esterase   (NEGATIVE)  


 


Urine RBC   (0-2/HPF)  


 


Urine WBC   (0-5/HPF)  


 


Ur Epithelial Cells   (NONE-FEW)  


 


Urine Bacteria   (NEGATIVE)  


 


Urine Mucus   (NONE-MOD)  











Med Orders - Current: 


 Current Medications





Acetaminophen (Tylenol)  650 mg PO Q4H PRN


   PRN Reason: Pain (Mild 1-3)/fever


Aspirin (Aspirin)  81 mg PO DAILY [facility]


   Last Admin: 05/19/20 08:24 Dose:  81 mg


Atorvastatin Calcium (Lipitor)  40 mg PO BEDTIME ASIA


Hydralazine HCl (Apresoline)  20 mg IVPUSH Q4H PRN


   PRN Reason: Hypertension


Sodium Chloride (Saline Flush)  10 ml FLUSH ASDIRECTED PRN


   PRN Reason: Keep Vein Open


Sodium Chloride (Saline Flush)  2.5 ml FLUSH ASDIRECTED PRN


   PRN Reason: Keep Vein Open


Sodium Chloride (Normal Saline)  10 ml IV ASDIRECTED PRN


   PRN Reason: IV Use





Discontinued Medications





Aspirin (Aspirin)  324 mg PO ONETIME ONE


   Stop: 05/18/20 19:11


   Last Admin: 05/18/20 19:29 Dose:  Not Given


Iopamidol (Isovue-370 (76%))  100 ml IVPUSH ONETIME STA


   Stop: 05/18/20 20:48


   Last Admin: 05/18/20 20:47 Dose:  100 ml











<Clarisse White - Last Filed: 05/21/20 12:18>





**Discharge Summary





- Hospital Course


HPI Initial Comments: 





I have seen and evaluated the patient and agree with the residents note unless 

specified in my note





- Referral to Home Health


Primary Care Physician: 


PCP None








- Discharge Diagnosis/Problem(s)


(1) Vision changes


SNOMED Code(s): 999823890


   ICD Code: H53.9 - UNSPECIFIED VISUAL DISTURBANCE   Status: Acute   





(2) CVA, old, aphasia


SNOMED Code(s): 781085671


   ICD Code: I69.320 - APHASIA FOLLOWING CEREBRAL INFARCTION   Status: Acute   





(3) HTN (hypertension)


SNOMED Code(s): 06365916


   ICD Code: I10 - ESSENTIAL (PRIMARY) HYPERTENSION   Status: Acute   





- Patient Data


Vitals - Most Recent: 


 Last Vital Signs











Temp  36.7 C   05/19/20 08:00


 


Pulse  52 L  05/19/20 08:00


 


Resp  16   05/19/20 08:00


 


BP  158/81 H  05/19/20 08:00


 


Pulse Ox  94 L  05/19/20 08:00











Med Orders - Current: 


 Current Medications








Discontinued Medications





Acetaminophen (Tylenol)  650 mg PO Q4H PRN


   PRN Reason: Pain (Mild 1-3)/fever


Aspirin (Aspirin)  324 mg PO ONETIME ONE


   Stop: 05/18/20 19:11


   Last Admin: 05/18/20 19:29 Dose:  Not Given


Aspirin (Aspirin)  81 mg PO DAILY [facility]


   Last Admin: 05/19/20 08:24 Dose:  81 mg


Atorvastatin Calcium (Lipitor)  40 mg PO BEDTIME ASIA


Hydralazine HCl (Apresoline)  20 mg IVPUSH Q4H PRN


   PRN Reason: Hypertension


Iopamidol (Isovue-370 (76%))  100 ml IVPUSH ONETIME STA


   Stop: 05/18/20 20:48


   Last Admin: 05/18/20 20:47 Dose:  100 ml


Sodium Chloride (Saline Flush)  10 ml FLUSH ASDIRECTED PRN


   PRN Reason: Keep Vein Open


Sodium Chloride (Saline Flush)  2.5 ml FLUSH ASDIRECTED PRN


   PRN Reason: Keep Vein Open


Sodium Chloride (Normal Saline)  10 ml IV ASDIRECTED PRN


   PRN Reason: IV Use

## 2020-05-21 NOTE — ECHO
EXAM DATE: 05/18/20



PATIENT'S AGE: 75





The ECHO report can be seen in this patient's EMR (Electronic Medical Record) 
in the REPORTS section. The report has also been scanned into PACS. 



CARRIE